# Patient Record
Sex: FEMALE | Race: WHITE | NOT HISPANIC OR LATINO | Employment: OTHER | URBAN - METROPOLITAN AREA
[De-identification: names, ages, dates, MRNs, and addresses within clinical notes are randomized per-mention and may not be internally consistent; named-entity substitution may affect disease eponyms.]

---

## 2021-01-25 ENCOUNTER — IMMUNIZATION (OUTPATIENT)
Dept: NURSING | Facility: CLINIC | Age: 46
End: 2021-01-25
Payer: COMMERCIAL

## 2021-01-25 PROCEDURE — 0001A PR COVID VAC PFIZER DIL RECON 30 MCG/0.3 ML IM: CPT

## 2021-01-25 PROCEDURE — 91300 PR COVID VAC PFIZER DIL RECON 30 MCG/0.3 ML IM: CPT

## 2021-02-15 ENCOUNTER — IMMUNIZATION (OUTPATIENT)
Dept: NURSING | Facility: CLINIC | Age: 46
End: 2021-02-15
Attending: FAMILY MEDICINE
Payer: COMMERCIAL

## 2021-02-15 PROCEDURE — 91300 PR COVID VAC PFIZER DIL RECON 30 MCG/0.3 ML IM: CPT

## 2021-02-15 PROCEDURE — 0002A PR COVID VAC PFIZER DIL RECON 30 MCG/0.3 ML IM: CPT

## 2021-03-07 ENCOUNTER — HEALTH MAINTENANCE LETTER (OUTPATIENT)
Age: 46
End: 2021-03-07

## 2021-10-11 ENCOUNTER — HEALTH MAINTENANCE LETTER (OUTPATIENT)
Age: 46
End: 2021-10-11

## 2022-03-27 ENCOUNTER — HEALTH MAINTENANCE LETTER (OUTPATIENT)
Age: 47
End: 2022-03-27

## 2022-06-22 ENCOUNTER — HOSPITAL ENCOUNTER (EMERGENCY)
Facility: CLINIC | Age: 47
Discharge: HOME | End: 2022-06-22
Attending: STUDENT IN AN ORGANIZED HEALTH CARE EDUCATION/TRAINING PROGRAM | Admitting: STUDENT IN AN ORGANIZED HEALTH CARE EDUCATION/TRAINING PROGRAM
Payer: COMMERCIAL

## 2022-06-22 ENCOUNTER — TELEPHONE (OUTPATIENT)
Dept: OPHTHALMOLOGY | Facility: CLINIC | Age: 47
End: 2022-06-22

## 2022-06-22 VITALS
OXYGEN SATURATION: 100 % | RESPIRATION RATE: 16 BRPM | SYSTOLIC BLOOD PRESSURE: 108 MMHG | DIASTOLIC BLOOD PRESSURE: 73 MMHG | TEMPERATURE: 97.9 F | WEIGHT: 158 LBS | HEART RATE: 74 BPM

## 2022-06-22 DIAGNOSIS — H57.12 EYE PAIN, LEFT: ICD-10-CM

## 2022-06-22 PROCEDURE — 99283 EMERGENCY DEPT VISIT LOW MDM: CPT

## 2022-06-22 PROCEDURE — 250N000009 HC RX 250: Performed by: STUDENT IN AN ORGANIZED HEALTH CARE EDUCATION/TRAINING PROGRAM

## 2022-06-22 RX ORDER — TETRACAINE HYDROCHLORIDE 5 MG/ML
1-2 SOLUTION OPHTHALMIC ONCE
Status: COMPLETED | OUTPATIENT
Start: 2022-06-22 | End: 2022-06-22

## 2022-06-22 RX ADMIN — TETRACAINE HYDROCHLORIDE 1 DROP: 5 SOLUTION OPHTHALMIC at 21:20

## 2022-06-22 RX ADMIN — FLUORESCEIN SODIUM 1 STRIP: 1 STRIP OPHTHALMIC at 21:22

## 2022-06-23 ENCOUNTER — OFFICE VISIT (OUTPATIENT)
Dept: OPHTHALMOLOGY | Facility: CLINIC | Age: 47
End: 2022-06-23
Attending: OPHTHALMOLOGY
Payer: COMMERCIAL

## 2022-06-23 DIAGNOSIS — H20.9 IRITIS OF LEFT EYE: Primary | ICD-10-CM

## 2022-06-23 PROCEDURE — 99204 OFFICE O/P NEW MOD 45 MIN: CPT | Mod: GC | Performed by: STUDENT IN AN ORGANIZED HEALTH CARE EDUCATION/TRAINING PROGRAM

## 2022-06-23 PROCEDURE — G0463 HOSPITAL OUTPT CLINIC VISIT: HCPCS

## 2022-06-23 RX ORDER — VALACYCLOVIR HYDROCHLORIDE 500 MG/1
500 TABLET, FILM COATED ORAL 3 TIMES DAILY
Qty: 30 TABLET | Refills: 0 | Status: SHIPPED | OUTPATIENT
Start: 2022-06-23

## 2022-06-23 RX ORDER — PREDNISOLONE ACETATE 10 MG/ML
1-2 SUSPENSION/ DROPS OPHTHALMIC
Qty: 15 ML | Refills: 0 | Status: SHIPPED | OUTPATIENT
Start: 2022-06-23

## 2022-06-23 RX ORDER — CYCLOPENTOLATE HYDROCHLORIDE 10 MG/ML
1 SOLUTION/ DROPS OPHTHALMIC ONCE
Qty: 1 ML | Refills: 0 | Status: SHIPPED | OUTPATIENT
Start: 2022-06-23 | End: 2022-06-23

## 2022-06-23 ASSESSMENT — EXTERNAL EXAM - RIGHT EYE: OD_EXAM: NORMAL

## 2022-06-23 ASSESSMENT — TONOMETRY
OS_IOP_MMHG: 11
OD_IOP_MMHG: 12
IOP_METHOD: TONOPEN

## 2022-06-23 ASSESSMENT — REFRACTION_WEARINGRX
OS_SPHERE: +2.75
OS_CYLINDER: SPHERE
OD_CYLINDER: SPHERE
OD_SPHERE: +2.75

## 2022-06-23 ASSESSMENT — VISUAL ACUITY
OD_CC: 20/20
CORRECTION_TYPE: GLASSES
OS_CC: 20/20
METHOD: SNELLEN - LINEAR

## 2022-06-23 ASSESSMENT — SLIT LAMP EXAM - LIDS
COMMENTS: NORMAL
COMMENTS: NORMAL

## 2022-06-23 ASSESSMENT — CONF VISUAL FIELD
METHOD: COUNTING FINGERS
OS_NORMAL: 1
OD_NORMAL: 1

## 2022-06-23 ASSESSMENT — CUP TO DISC RATIO
OD_RATIO: 0.3
OS_RATIO: 0.3

## 2022-06-23 ASSESSMENT — EXTERNAL EXAM - LEFT EYE: OS_EXAM: NORMAL

## 2022-06-23 NOTE — TELEPHONE ENCOUNTER
Telephone Encounter:    Was paged by Dr. Luu at [facility] regarding patient Dr. Joyner, who is a 47-year-old female presenting with significant degree of photosensitivity in the left eye.  As per Dr. Luu, patient woke up this morning, and has a history of contact lens usage, although did not sleep in her contact lenses.  She woke up with severe pain in the left eye and severe photophobia.  Lights are significantly bothersome for her.  On evaluation, ED provider reports that her visual acuity was overall intact, she was not complaining of blurred vision.  Furthermore pupils were reactive, cornea was stained, and look within normal limits.  Intraocular pressure was evaluated, and was fine.  Identified that the sclera appeared injected.    Discussed what this finding could represent, discuss plan for patient, and ED provider feels that appropriate follow-up in the a.m. is warranted for further evaluation.  Agree with this plan.  We will have patient come for urgent evaluation at the [facility], at 7:30 AM on 6/23/2022.    Luis Condon MD - PGY3  Department of Ophthalmology  Pager: [phone]

## 2022-06-23 NOTE — PATIENT INSTRUCTIONS
MsRoxy Joyner,     You have inflammation in the left eye called iritis (anterior uveitis).     Please start prednisolone every hour while awake in the left eye.    Please start cyclopentolate 3x/day in the left eye.    Take valtrex 500 mg by mouth 3x/day.    Follow up in 1 week.    Thank you for allowing us to participate in your care at the [facility].

## 2022-06-23 NOTE — ED TRIAGE NOTES
Pt presents with worsening left eye redness, pressure, and photo sensitivity. Pt denies injury or drainage. Pt normally wears contacts but denies changes in vision     Triage Assessment     Row Name 06/22/22 1926       Triage Assessment (Adult)    Airway WDL WDL       Respiratory WDL    Respiratory WDL WDL       Skin Circulation/Temperature WDL    Skin Circulation/Temperature WDL WDL       Cardiac WDL    Cardiac WDL WDL       Peripheral/Neurovascular WDL    Peripheral Neurovascular WDL WDL       Cognitive/Neuro/Behavioral WDL    Cognitive/Neuro/Behavioral WDL WDL

## 2022-06-23 NOTE — ED PROVIDER NOTES
Emergency Department Encounter         FINAL IMPRESSION:  Eye pain        ED COURSE AND MEDICAL DECISION MAKING       ED Course as of 06/22/22 2101 Wed Jun 22, 2022 2038 47-year-old no chronic medical problems, here with left eye pain.  Patient states that she woke from sleep this morning with severe eye pain.  States she has severe photophobia.  No actual blurry vision.  Was seen in urgent care then sent her here for rule out glaucoma.  No trauma to the eye.  On arrival she looks well.  The left eye does have some scleral injection.  She has normal equal round reactive pupils bilaterally.  No signs of preseptal or postoperative cellulitis.  Full range of motion of the eye.  Bedside testing of the pressures show no significant elevation.     -Multiple checks of patient's left eye and right eye showing normal pressures below 15.  I called the ophthalmology resident who would like to see patient tomorrow morning at 730.  Patient comfortable with this plan.      8:07 PM I met the patient and performed my initial interview and exam.    8:24 PM I obtained patients eye pressures. Right eye had pressure of 12 one time and pressure of 9 four times. Left eye had pressure of 10 three times and pressure of 9 two times.   8:37 PM I performed a woods lamp procedure.   8:55 PM I spoke with Dr. Condon, ophthalmology.   9:00 PM I rechecked and updated the patient. Spoke about plan for discharge.       MEDICATIONS GIVEN IN THE EMERGENCY DEPARTMENT:  Medications   tetracaine (PONTOCAINE) 0.5 % ophthalmic solution 1-2 drop (has no administration in time range)   fluorescein (FUL-SUZIE) ophthalmic strip 1 strip (has no administration in time range)       NEW PRESCRIPTIONS STARTED AT TODAY'S ED VISIT:  New Prescriptions    No medications on file       HPI     Patient information obtained from: Patient    Use of Interpretor: N/A     Heaven Joyner is a 47 year old female with no pertinent history who presents to this ED by walk in  for evaluation of eye problem.    Patient reports that she woke up at 0530 today with some left eye redness and irritation. Her symptoms have worsened as the day has progressed and now has pain and photophobia. Her right eye is fine and is wearing a contact in that eye but not the left one. She has no history of this. Patient last saw an ophthalmologist over a year ago. After receiving eye drops here in the ED patient still endorsed left eye pain. She denies any other complaints at this time.         REVIEW OF SYSTEMS:  Review of Systems   Constitutional: Negative for fever, malaise  HEENT: Positive for left eye pain, redness, and photophobia. Negative runny nose, sore throat, ear pain, neck pain  Respiratory: Negative for shortness of breath, cough, congestion  Cardiovascular: Negative for chest pain, leg edema  Gastrointestinal: Negative for abdominal distention, abdominal pain, constipation, vomiting, nausea, diarrhea  Genitourinary: Negative for dysuria and hematuria.   Integument: Negative for rash, skin breakdown  Neurological: Negative for paresthesias, weakness, headache.  Musculoskeletal: Negative for joint pain, joint swelling      All other systems reviewed and are negative.          MEDICAL HISTORY     History reviewed. No pertinent past medical history.    History reviewed. No pertinent surgical history.         No current outpatient medications on file.          PHYSICAL EXAM     /73   Pulse 74   Temp 97.9  F (36.6  C) (Temporal)   Resp 16   Wt 71.7 kg (158 lb)   SpO2 100%       PHYSICAL EXAM:     General: Patient appears well, nontoxic, comfortable  HEENT: Moist mucous membranes, no tongue swelling.  No head trauma.  No midline neck pain.  Left sclera mildly injected.  No periorbital cellulitis.  Pupil equal reactive bilaterally.  Full range of motion of the eyes bilaterally.  Abdominal: Soft, nontender, nondistended, no palpable masses, no guarding, no rebound  Musculoskeletal: Full range  of motion of joints, no deformities appreciated.  Neurological: Alert and oriented, grossly neurologically intact.  Psychological: Normal affect and mood.  Integument: No rashes appreciated          RESULTS       Labs Ordered and Resulted from Time of ED Arrival to Time of ED Departure - No data to display    No orders to display         PROCEDURES:  Procedures:  Procedures       PROCEDURE: Intraocular Pressure Measurement   DEVICE USED: Tonopen   INDICATIONS: Left eye pain and redness   PROCEDURE PROVIDER: Dr Justin Luu   SITE: Eyes   MEDICATION: 0.5% Tetracaine ophthalmic drops were applied to both eyes   NOTE: Administered 2 drops of above solution, rapid anesthesia achieved. Using standard technique, performed Tonopen exam, which showed intraocular pressure(s) of;  12, 12, 10 and 9 mmHg in Right eye  10, 10, 10, 10 mmHg in Left eye       COMPLICATIONS: Patient tolerated procedure well, without complication       PROCEDURE: Woods lamp Exam   INDICATIONS: Left eye pain   PROCEDURE PROVIDER: Dr Justin Luu   SITE: left eye   CONSENT:  The risks, benefits and alternatives for this procedure were explained to the patient and verbally accepted.     MEDICATION: fluorescein stain and tetracaine   EXAM FINDINGS: Right Eye: NA  Left Eye: no findings   COMPLICATIONS: Patient tolerated procedure well, without complication          IJuan am serving as a scribe to document services personally performed by Justin Luu DO, based on my observations and the provider's statements to me.  I, Justin Luu DO, attest that Juan Mazariegos is acting in a scribe capacity, has observed my performance of the services and has documented them in accordance with my direction.    Justin Luu DO  Emergency Medicine  [facility] EMERGENCY ROOM     Justin Luu DO  06/23/22 030

## 2022-06-23 NOTE — DISCHARGE INSTRUCTIONS
I discussed the case with the on-call ophthalmologist.  He would like to see you tomorrow morning at:    7:30 AM:    [address] [id number]

## 2022-06-23 NOTE — NURSING NOTE
Chief Complaints and History of Present Illnesses   Patient presents with     Eye Pain Left Eye     Eye pain, left       Chief Complaint(s) and History of Present Illness(es)     Eye Pain Left Eye     Comments: Eye pain, left                Comments     Pt was seen in the Ed yesterday   Pt C/o eye pain and light sen left eye since yesterday   States it is some better today,  2/10 on the pain scale  No flashes , floaters    Patti Weaver COT 7:29 AM June 23, 2022

## 2022-06-23 NOTE — PROGRESS NOTES
Continuity Clinic Note  Patient Name: Heaven Joyner  Patient : 1975  Today's Date: 2022    Technician Evaluation:     Chief Complaint(s) and History of Present Illness(es)     Eye Pain Left Eye     Comments: Eye pain, left       Comments     Pt was seen in the Ed yesterday   Pt C/o eye pain and light sen left eye since yesterday   States it is some better today,  2/10 on the pain scale  No flashes , floaters    Patti Weaver COT 7:29 AM 2022       Ms. Joyner is a 47 year old woman presenting for evaluation of light sensitivity in the left eye. When she woke up yesterday her left eye was red and painful. As the day went on her eye became more light sensitive to the point where she did not feel safe driving (was driving back from [place]). She wears daily contacts and always takes them out and says she never sleeps in them. Her contacts were prescribed 2 years ago. She has never had anything like this before. She denies history of cold sores.     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      History reviewed. No pertinent past medical history.    Current Outpatient Medications   Medication     cyclopentolate (CYCLOGYL) 1 % ophthalmic solution     prednisoLONE acetate (PRED FORTE) 1 % ophthalmic suspension     valACYclovir (VALTREX) 500 MG tablet     No current facility-administered medications for this visit.       Base Eye Exam     Visual Acuity (Snellen - Linear)       Right Left    Dist cc 20/20 20/20          Tonometry (Tonopen, 7:37 AM)       Right Left    Pressure 12 11          Pupils       Dark Shape APD    Right 4 Round None    Left 3.5 Round None          Visual Fields (Counting fingers)       Left Right     Full Full          Extraocular Movement       Right Left     Full Full          Neuro/Psych     Oriented x3: Yes    Mood/Affect: Normal          Dilation     Both eyes: 1.0% Mydriacyl, 2.5% Luis Daniel Synephrine @ 8:15 AM            Slit Lamp and Fundus Exam      External Exam       Right Left    External Normal Normal          Slit Lamp Exam       Right Left    Lids/Lashes Normal Normal    Conjunctiva/Sclera White and quiet trace injection, trace chemosis    Cornea Clear horizontal possible dendritic lesion inferiorly with small round staining at the base    Anterior Chamber Deep and quiet 2+ cell    Iris Round and reactive, retained pupillary membrane Round and reactive, retained pupillary membrane    Lens Clear Clear    Vitreous Normal Normal          Fundus Exam       Right Left    Disc Normal Normal    C/D Ratio 0.3 0.3    Macula Normal Normal    Vessels Normal Normal    Periphery Normal Normal            Refraction     Wearing Rx       Sphere Cylinder    Right +2.75 Sphere    Left +2.75 Sphere                  Assessment & Plan     Heaven Joyner is a 47 year old female with the following diagnoses:   1. Iritis of left eye       # Iritis, first episode  - she has 2+ cell and a possible dendritic lesion in the cornea (denies hx of cold sores)  - no posterior vitritis or retinitis  - she has never had anything like this before    Plan:  - stay out of contacts  - start prednisolone q1hr while awake left eye  - start cyclopentolate TID left eye  - start valtrex 500 mg PO TID  - follow up in 1 week for VT    Patient disposition:   Return in about 1 week (around 6/30/2022) for Follow up VT.      Seen and discussed with Dr. Meg Singh MD  Ophthalmology Resident, PGY-2  [facility]    Attending Physician Attestation:  Complete documentation of historical and exam elements from today's encounter can be found in the full encounter summary report (not reduplicated in this progress note).  I personally obtained the chief complaint(s) and history of present illness.  I confirmed and edited as necessary the review of systems, past medical/surgical history, family history, social history, and examination findings as documented by others; and I examined  the patient myself.  I personally reviewed the relevant tests, images, and reports as documented above.  I formulated and edited as necessary the assessment and plan and discussed the findings and management plan with the patient and family. . - James Weaver MD

## 2022-06-29 ENCOUNTER — OFFICE VISIT (OUTPATIENT)
Dept: OPHTHALMOLOGY | Facility: CLINIC | Age: 47
End: 2022-06-29
Attending: OPHTHALMOLOGY
Payer: COMMERCIAL

## 2022-06-29 DIAGNOSIS — B00.52 HERPES KERATITIS: ICD-10-CM

## 2022-06-29 DIAGNOSIS — H20.9 IRITIS OF LEFT EYE: Primary | ICD-10-CM

## 2022-06-29 PROCEDURE — 99213 OFFICE O/P EST LOW 20 MIN: CPT | Performed by: OPHTHALMOLOGY

## 2022-06-29 PROCEDURE — 92285 EXTERNAL OCULAR PHOTOGRAPHY: CPT | Performed by: OPHTHALMOLOGY

## 2022-06-29 PROCEDURE — G0463 HOSPITAL OUTPT CLINIC VISIT: HCPCS

## 2022-06-29 RX ORDER — CYCLOPENTOLATE HYDROCHLORIDE 10 MG/ML
SOLUTION/ DROPS OPHTHALMIC
COMMUNITY
Start: 2022-06-23

## 2022-06-29 ASSESSMENT — TONOMETRY
OS_IOP_MMHG: 12
OD_IOP_MMHG: 13
IOP_METHOD: ICARE

## 2022-06-29 ASSESSMENT — VISUAL ACUITY
OS_CC+: -2
METHOD: SNELLEN - LINEAR
OS_PH_CC+: -1
OD_CC: 20/20
OS_PH_CC: 20/20
OS_CC: 20/40

## 2022-06-29 ASSESSMENT — EXTERNAL EXAM - LEFT EYE: OS_EXAM: NORMAL

## 2022-06-29 ASSESSMENT — CONF VISUAL FIELD
OS_NORMAL: 1
OD_NORMAL: 1
METHOD: COUNTING FINGERS

## 2022-06-29 ASSESSMENT — EXTERNAL EXAM - RIGHT EYE: OD_EXAM: NORMAL

## 2022-06-29 ASSESSMENT — SLIT LAMP EXAM - LIDS
COMMENTS: NORMAL
COMMENTS: NORMAL

## 2022-06-29 NOTE — PROGRESS NOTES
HPI     Follow Up      Additional comments: Iritis of left eye               Comments     Pt states left eye feels dry.  Pt states she is not using Cyclo , tried one day and quit due to light sens  No flashes, floaters or eye pain  Using:  prednisolone q1hr while awake left eye (taperedto 6x)   cyclopentolate TID left eye (not using)  valtrex 500 mg PO TID    Patti Weaver COT 3:16 PM June 29, 2022               Last edited by Baldo Krishnamurthy MD on 6/29/2022  4:06 PM. (History)            History reviewed. No pertinent past medical history.    Current Outpatient Medications   Medication     prednisoLONE acetate (PRED FORTE) 1 % ophthalmic suspension     valACYclovir (VALTREX) 500 MG tablet     cyclopentolate (CYCLOGYL) 1 % ophthalmic solution     No current facility-administered medications for this visit.       Base Eye Exam     Visual Acuity (Snellen - Linear)       Right Left    Dist cc 20/20 20/40 -2    Dist ph cc  20/20 -1          Tonometry ([facility], 3:19 PM)       Right Left    Pressure 13 12          Pupils       Dark Shape APD    Right 4 Round None    Left 3.5 Round None          Visual Fields (Counting fingers)       Left Right     Full Full          Extraocular Movement       Right Left     Full Full          Neuro/Psych     Oriented x3: Yes    Mood/Affect: Normal            Slit Lamp and Fundus Exam     External Exam       Right Left    External Normal Normal          Slit Lamp Exam       Right Left    Lids/Lashes Normal Normal    Conjunctiva/Sclera White and quiet White and quiet    Cornea Clear horizontal ? dendritic lesion inferiorly, there is mild staining of temporal lesions    Anterior Chamber Deep and quiet Deep and quiet    Iris Round and reactive, retained pupillary membrane Round and reactive, retained pupillary membrane    Lens Clear Clear                  Assessment & Plan     Heaven Joyner is a 47 year old female with the following diagnoses:   1. Iritis of left eye       #  Iritis, first episode  # herpes keratitis  - she had 2+ cell and a possible dendritic lesion in the cornea (denies hx of cold sores)  - no posterior vitritis or retinitis  - she has never had anything like this before    Plan:  - stay out of contacts  - taper prednisolone to four times a day    - complete valtrex 500 mg PO three times a day x 10 days  -PF AT every two hours PRN  - follow up in 1 week for VT    Patient disposition:   Return in about 1 week (around 7/6/2022) for v/t.        Attending Physician Attestation:  Complete documentation of historical and exam elements from today's encounter can be found in the full encounter summary report (not reduplicated in this progress note).  I personally obtained the chief complaint(s) and history of present illness.  I confirmed and edited as necessary the review of systems, past medical/surgical history, family history, social history, and examination findings as documented by others; and I examined the patient myself.  I personally reviewed the relevant tests, images, and reports as documented above.  I formulated and edited as necessary the assessment and plan and discussed the findings and management plan with the patient and family. - Baldo Krishnamurthy MD

## 2022-06-29 NOTE — NURSING NOTE
Chief Complaints and History of Present Illnesses   Patient presents with     Follow Up     Iritis of left eye      Chief Complaint(s) and History of Present Illness(es)     Follow Up     Comments: Iritis of left eye               Comments     Pt states left eye feels dry.  Pt states she is not using Cyclo , tried one day and quit due to light sens  No flashes, floaters or eye pain  Using:  prednisolone q1hr while awake left eye   cyclopentolate TID left eye  valtrex 500 mg PO TID    Patti Weaver COT 3:16 PM June 29, 2022

## 2022-07-06 ENCOUNTER — OFFICE VISIT (OUTPATIENT)
Dept: OPHTHALMOLOGY | Facility: CLINIC | Age: 47
End: 2022-07-06
Attending: OPHTHALMOLOGY
Payer: COMMERCIAL

## 2022-07-06 DIAGNOSIS — H20.9 IRITIS OF LEFT EYE: Primary | ICD-10-CM

## 2022-07-06 DIAGNOSIS — B00.52 HERPES KERATITIS: ICD-10-CM

## 2022-07-06 PROCEDURE — G0463 HOSPITAL OUTPT CLINIC VISIT: HCPCS

## 2022-07-06 PROCEDURE — 99212 OFFICE O/P EST SF 10 MIN: CPT | Performed by: OPHTHALMOLOGY

## 2022-07-06 ASSESSMENT — REFRACTION_WEARINGRX
OS_CYLINDER: SPHERE
OD_SPHERE: +2.75
OD_CYLINDER: SPHERE
OS_SPHERE: +2.75

## 2022-07-06 ASSESSMENT — TONOMETRY
OS_IOP_MMHG: 6
IOP_METHOD: TONOPEN
OD_IOP_MMHG: 9

## 2022-07-06 ASSESSMENT — EXTERNAL EXAM - LEFT EYE: OS_EXAM: NORMAL

## 2022-07-06 ASSESSMENT — VISUAL ACUITY
OS_PH_CC: 20/25
OS_CC: 20/40
CORRECTION_TYPE: GLASSES
OS_PH_CC+: -1
METHOD: SNELLEN - LINEAR
OS_CC+: -1
OD_CC: 20/20

## 2022-07-06 ASSESSMENT — EXTERNAL EXAM - RIGHT EYE: OD_EXAM: NORMAL

## 2022-07-06 ASSESSMENT — CONF VISUAL FIELD
OS_NORMAL: 1
METHOD: COUNTING FINGERS
OD_NORMAL: 1

## 2022-07-06 ASSESSMENT — SLIT LAMP EXAM - LIDS
COMMENTS: NORMAL
COMMENTS: NORMAL

## 2022-07-06 NOTE — PROGRESS NOTES
HPI     Iritis Follow Up     In left eye.  Since onset it is stable.  Associated symptoms include Negative for eye pain, photophobia, flashes and floaters.              Comments     48yo female here for iritis follow up left eye. Vision is stable since last visit. She has finished course of drops and valtrex. She does not use any eye drops currently.    Deo Rodriguez COT 10:42 AM July 6, 2022             Last edited by Deo Rodriguez on 7/6/2022 10:42 AM. (History)            No past medical history on file.    Current Outpatient Medications   Medication     cyclopentolate (CYCLOGYL) 1 % ophthalmic solution     prednisoLONE acetate (PRED FORTE) 1 % ophthalmic suspension     valACYclovir (VALTREX) 500 MG tablet     No current facility-administered medications for this visit.       Base Eye Exam     Visual Acuity (Snellen - Linear)       Right Left    Dist cc 20/20 20/40 -1    Dist ph cc  20/25 -1    Correction: Glasses          Tonometry (Tonopen, 10:46 AM)       Right Left    Pressure 9 6          Pupils       Shape APD    Right Round None    Left Round None          Visual Fields (Counting fingers)       Left Right     Full Full          Extraocular Movement       Right Left     Full Full          Neuro/Psych     Oriented x3: Yes    Mood/Affect: Normal            Slit Lamp and Fundus Exam     External Exam       Right Left    External Normal Normal          Slit Lamp Exam       Right Left    Lids/Lashes Normal Normal    Conjunctiva/Sclera White and quiet White and quiet    Cornea Clear Few small subepi cysts, no haze    Anterior Chamber Deep and quiet Deep and quiet    Iris Round and reactive, retained pupillary membrane Round and reactive, retained pupillary membrane    Lens Clear Clear            Refraction     Wearing Rx       Sphere Cylinder    Right +2.75 Sphere    Left +2.75 Sphere                  Assessment & Plan     Heaven Joyner is a 47 year old female with the following diagnoses:   1. Iritis of left eye     2. Herpes keratitis       # Iritis, first episode  # herpes keratitis  - she had 2+ cell and a possible dendritic lesion in the cornea (denies hx of cold sores)  - no posterior vitritis or retinitis  - she has never had anything like this before  - vision back to baseline today  - off all drops  - she does recall a prior history of left eye K abrasion  - improved K appearance left eye for possible dendrite vs now likely old scar    Plan:  - OK to restart contacts  - continue AT  PRN      Patient disposition:   Return if symptoms worsen or fail to improve.        Attending Physician Attestation:  Complete documentation of historical and exam elements from today's encounter can be found in the full encounter summary report (not reduplicated in this progress note).  I personally obtained the chief complaint(s) and history of present illness.  I confirmed and edited as necessary the review of systems, past medical/surgical history, family history, social history, and examination findings as documented by others; and I examined the patient myself.  I personally reviewed the relevant tests, images, and reports as documented above.  I formulated and edited as necessary the assessment and plan and discussed the findings and management plan with the patient and family. - Baldo Krishnamurthy MD

## 2022-07-06 NOTE — NURSING NOTE
Chief Complaints and History of Present Illnesses   Patient presents with     Iritis Follow Up     Chief Complaint(s) and History of Present Illness(es)     Iritis Follow Up     Laterality: left eye    Course: stable    Associated symptoms: Negative for eye pain, photophobia, flashes and floaters              Comments     46yo female here for iritis follow up left eye. Vision is stable since last visit. She has finished course of drops and valtrex. She does not use any eye drops currently.    Deo Rodriguez COT 10:42 AM July 6, 2022

## 2022-09-25 ENCOUNTER — HEALTH MAINTENANCE LETTER (OUTPATIENT)
Age: 47
End: 2022-09-25

## 2023-01-30 ENCOUNTER — HEALTH MAINTENANCE LETTER (OUTPATIENT)
Age: 48
End: 2023-01-30

## 2023-05-08 ENCOUNTER — HEALTH MAINTENANCE LETTER (OUTPATIENT)
Age: 48
End: 2023-05-08

## 2024-03-02 ENCOUNTER — HEALTH MAINTENANCE LETTER (OUTPATIENT)
Age: 49
End: 2024-03-02

## 2025-03-15 ENCOUNTER — HEALTH MAINTENANCE LETTER (OUTPATIENT)
Age: 50
End: 2025-03-15

## 2025-05-17 ENCOUNTER — HEALTH MAINTENANCE LETTER (OUTPATIENT)
Age: 50
End: 2025-05-17